# Patient Record
Sex: FEMALE | Race: WHITE | NOT HISPANIC OR LATINO | URBAN - METROPOLITAN AREA
[De-identification: names, ages, dates, MRNs, and addresses within clinical notes are randomized per-mention and may not be internally consistent; named-entity substitution may affect disease eponyms.]

---

## 2018-10-02 NOTE — ASU PATIENT PROFILE, ADULT - PSH
History of colonoscopy    History of partial thyroidectomy    History of varicose vein stripping    Malignant melanoma of ankle    Status post cholecystectomy History of colonoscopy    History of partial thyroidectomy    History of varicose vein stripping    Malignant melanoma of ankle  MOHS  Status post cholecystectomy

## 2018-10-02 NOTE — ASU PATIENT PROFILE, ADULT - PMH
Barretts esophagus    GERD (gastroesophageal reflux disease)    HLD (hyperlipidemia)    Hypertension    Hypothyroid

## 2018-10-03 ENCOUNTER — OUTPATIENT (OUTPATIENT)
Dept: OUTPATIENT SERVICES | Facility: HOSPITAL | Age: 83
LOS: 1 days | End: 2018-10-03
Payer: MEDICARE

## 2018-10-03 VITALS
DIASTOLIC BLOOD PRESSURE: 66 MMHG | SYSTOLIC BLOOD PRESSURE: 136 MMHG | RESPIRATION RATE: 14 BRPM | HEART RATE: 64 BPM | OXYGEN SATURATION: 98 %

## 2018-10-03 VITALS
SYSTOLIC BLOOD PRESSURE: 158 MMHG | DIASTOLIC BLOOD PRESSURE: 78 MMHG | TEMPERATURE: 98 F | HEART RATE: 65 BPM | HEIGHT: 63 IN | OXYGEN SATURATION: 97 % | WEIGHT: 148.59 LBS | RESPIRATION RATE: 21 BRPM

## 2018-10-03 DIAGNOSIS — Z98.890 OTHER SPECIFIED POSTPROCEDURAL STATES: Chronic | ICD-10-CM

## 2018-10-03 DIAGNOSIS — C43.70 MALIGNANT MELANOMA OF UNSPECIFIED LOWER LIMB, INCLUDING HIP: Chronic | ICD-10-CM

## 2018-10-03 DIAGNOSIS — Z90.49 ACQUIRED ABSENCE OF OTHER SPECIFIED PARTS OF DIGESTIVE TRACT: Chronic | ICD-10-CM

## 2018-10-03 DIAGNOSIS — H11.051 PERIPHERAL PTERYGIUM, PROGRESSIVE, RIGHT EYE: ICD-10-CM

## 2018-10-03 PROCEDURE — C1889: CPT

## 2018-10-03 PROCEDURE — 88304 TISSUE EXAM BY PATHOLOGIST: CPT | Mod: 26

## 2018-10-03 PROCEDURE — 88304 TISSUE EXAM BY PATHOLOGIST: CPT

## 2018-10-03 PROCEDURE — 65426 REMOVAL OF EYE LESION: CPT | Mod: RT

## 2020-03-14 NOTE — ASU DISCHARGE PLAN (ADULT/PEDIATRIC). - CHECK WITH YOUR DOCTORS ABOUT SHOWERS, HAIR WASHING
Written/documented by Indira Green, acting as a scribe in Dr. Cortes's presence.    Chief Complaint   Patient presents with   • Sinus Problem     CT follow up      Visit: Subsequent    SUBJECTIVE:  Teresa Garcia is a 37 year old female who presents for follow up. At last appt, pt was prescribed Augmentin, Medrol Dosepak, Flonase nasal spray, and Xyzal and was advised for nasal hygiene regimen.  Sinus culture was negative.  Today, pt reports she continues to have intermittent aural pressure (R>L) and clear PND although sinus HA and nasal congestion have improved.  She notes crackling noise when she lays.  Pt has noticed correlation of HA with exposure to allergens such as dust and gluten. Denies facial pain, facial pressure, nasal drainage.      PMHx/PSHx/Meds/Allergies/Social Hx/Family Hx/ROS reviewed from previous visit and remain unchanged.    PHYSICAL EXAMINATION:   Ht 5' 4\" (1.626 m)   Wt 72.6 kg (160 lb)   LMP 04/11/2019 (Exact Date)   BMI 27.46 kg/m²   BSA 1.78 m²    --General appearance: Well developed, well nourished, in no apparent distress  --Ability to communicate: Appropriate  --Head and scalp: No scalp lesions  --Eyes: No redness, swelling or drainage.  --Ears:     R ear: EAC patent, tympanic membrane intact, no middle ear effusion, no otorrhea    L ear: EAC patent, tympanic membrane intact, no middle ear effusion, no otorrhea  --Oral Cavity/Oral Pharynx: No buccal mucosal lesions, no pigmented mucosal lesions, palatal elevation symmetric, tongue motion intact, no floor of mouth palpable masses, no posterior pharyngeal wall fullness  --Nose: Nares patent, no rhinorrhea or epistaxis. Septum midline, no turbinate hypertrophy.  --Neck: Trachea midline, no palpable thyroid nodules  --Skin: No pigmented lesions on face, neck  --Psychiatric: Oriented to time, place and person  --Lymphatic: No palpable cervical adenopathy     IMAGING: CT scan personally reviewed with patient and images explained. See  radiology interpretation below.   FINDINGS: The paranasal sinuses are clear.  The ostiomeatal units are patent.  There is mild rightward deviation and spurring of the nasal septum.  Symmetric pneumatization of the anterior clinoid processes.       Grossly unremarkable appearance of the orbits.  The visualized facial bones are intact.     IMPRESSION:   Clear sinuses.    ASSESSMENT/PLAN:  Chronic sinusitis   Deviated septum, mild to moderate  Eustachian tube dysfunction, bilateral  Allergic rhinitis     PLAN:  - Recommended for air filter for bedroom as well as hypoallergenic mattress and pillow covers  - Referral placed to [facility] for formal allergy testing  - Continue Flonase 2 sprays each nostril once daily.  Proper application was demonstrated in office today.   - Continue antihistamine (Xyzal) once nightly.   - Continue nasal hygiene regimen with nasal saline (2 sprays each nostril 4-5x daily) and Petros Med Sinus rinse irrigations once daily.  - Consider septoplasty in the future if symptoms do not improve through conservative measures     - RTC 3-4 months   - Discussed with patient treatment plan as above, and patient agreeable.  All patient's questions and concerns were addressed and answered.     Saundra Cortes MD    Scribe: Electronically signed: Indira Green has scribed for Dr. Cortes 3/14/2020  I have reviewed and edited the progress note and agree with what has been scribed. Electronically signed by: Saundra Cortes MD 3/14/2020         Statement Selected

## 2022-06-08 NOTE — ASU PATIENT PROFILE, ADULT - ABILITY TO HEAR (WITH HEARING AID OR HEARING APPLIANCE IF NORMALLY USED):
Adequate: hears normal conversation without difficulty
no nausea/no vomiting/no diarrhea/no constipation/no abdominal pain
